# Patient Record
Sex: MALE | Race: BLACK OR AFRICAN AMERICAN | NOT HISPANIC OR LATINO | URBAN - METROPOLITAN AREA
[De-identification: names, ages, dates, MRNs, and addresses within clinical notes are randomized per-mention and may not be internally consistent; named-entity substitution may affect disease eponyms.]

---

## 2017-01-10 ENCOUNTER — HISTORICAL (OUTPATIENT)
Dept: OTOLARYNGOLOGY | Facility: CLINIC | Age: 52
End: 2017-01-10

## 2017-01-12 ENCOUNTER — HISTORICAL (OUTPATIENT)
Dept: OTOLARYNGOLOGY | Facility: CLINIC | Age: 52
End: 2017-01-12

## 2017-03-06 ENCOUNTER — HISTORICAL (OUTPATIENT)
Dept: ADMINISTRATIVE | Facility: HOSPITAL | Age: 52
End: 2017-03-06

## 2017-07-24 ENCOUNTER — HISTORICAL (OUTPATIENT)
Dept: ADMINISTRATIVE | Facility: HOSPITAL | Age: 52
End: 2017-07-24

## 2017-07-24 LAB
ALBUMIN SERPL-MCNC: 3.2 GM/DL (ref 3.4–5)
ALBUMIN/GLOB SERPL: 1 RATIO (ref 1–2)
ALP SERPL-CCNC: 70 UNIT/L (ref 45–117)
ALT SERPL-CCNC: 28 UNIT/L (ref 12–78)
APPEARANCE, UA: CLEAR
AST SERPL-CCNC: 14 UNIT/L (ref 15–37)
BACTERIA #/AREA URNS AUTO: ABNORMAL /[HPF]
BILIRUB SERPL-MCNC: 0.3 MG/DL (ref 0.2–1)
BILIRUB UR QL STRIP: NEGATIVE
BILIRUBIN DIRECT+TOT PNL SERPL-MCNC: 0.1 MG/DL
BILIRUBIN DIRECT+TOT PNL SERPL-MCNC: 0.2 MG/DL
BUN SERPL-MCNC: 14 MG/DL (ref 7–18)
CALCIUM SERPL-MCNC: 9.1 MG/DL (ref 8.5–10.1)
CHLORIDE SERPL-SCNC: 105 MMOL/L (ref 98–107)
CHOLEST SERPL-MCNC: 170 MG/DL
CHOLEST/HDLC SERPL: 5.2 {RATIO} (ref 0–5)
CO2 SERPL-SCNC: 26 MMOL/L (ref 21–32)
COLOR UR: ABNORMAL
CREAT SERPL-MCNC: 0.9 MG/DL (ref 0.6–1.3)
CREAT UR-MCNC: 100 MG/DL
EST. AVERAGE GLUCOSE BLD GHB EST-MCNC: 326 MG/DL
GLOBULIN SER-MCNC: 5.3 GM/ML (ref 2.3–3.5)
GLUCOSE (UA): >1000 MG/DL
GLUCOSE SERPL-MCNC: 250 MG/DL (ref 74–106)
HBA1C MFR BLD: 13 % (ref 4.2–6.3)
HDLC SERPL-MCNC: 33 MG/DL
HGB UR QL STRIP: NEGATIVE
HIV 1+2 AB+HIV1 P24 AG SERPL QL IA: NONREACTIVE
HYALINE CASTS #/AREA URNS LPF: ABNORMAL /[LPF]
KETONES UR QL STRIP: NEGATIVE
LDLC SERPL CALC-MCNC: 106 MG/DL (ref 0–130)
LEUKOCYTE ESTERASE UR QL STRIP: NEGATIVE
MICROALBUMIN UR-MCNC: 111 MG/L (ref 0–19)
MICROALBUMIN/CREAT RATIO PNL UR: 111 MCG/MG CR (ref 0–29)
NITRITE UR QL STRIP: NEGATIVE
PH UR STRIP: 5.5 [PH] (ref 4.5–8)
POTASSIUM SERPL-SCNC: 4.1 MMOL/L (ref 3.5–5.1)
PROT SERPL-MCNC: 8.5 GM/DL (ref 6.4–8.2)
PROT UR QL STRIP: 20 MG/DL
RBC #/AREA URNS AUTO: ABNORMAL /[HPF]
SODIUM SERPL-SCNC: 138 MMOL/L (ref 136–145)
SP GR UR STRIP: 1.03 (ref 1–1.03)
SQUAMOUS #/AREA URNS LPF: ABNORMAL /[LPF]
TRIGL SERPL-MCNC: 156 MG/DL
UROBILINOGEN UR STRIP-ACNC: NORMAL
VLDLC SERPL CALC-MCNC: 31 MG/DL
WBC #/AREA URNS AUTO: ABNORMAL /HPF

## 2018-05-21 ENCOUNTER — HISTORICAL (OUTPATIENT)
Dept: INTERNAL MEDICINE | Facility: CLINIC | Age: 53
End: 2018-05-21

## 2018-05-21 LAB
ABS NEUT (OLG): 2.73 X10(3)/MCL (ref 2.1–9.2)
ALBUMIN SERPL-MCNC: 3.2 GM/DL (ref 3.4–5)
ALBUMIN/GLOB SERPL: 1 RATIO (ref 1–2)
ALP SERPL-CCNC: 70 UNIT/L (ref 45–117)
ALT SERPL-CCNC: 34 UNIT/L (ref 12–78)
AST SERPL-CCNC: 15 UNIT/L (ref 15–37)
BASOPHILS # BLD AUTO: 0.03 X10(3)/MCL
BASOPHILS NFR BLD AUTO: 0 %
BILIRUB SERPL-MCNC: 0.4 MG/DL (ref 0.2–1)
BILIRUBIN DIRECT+TOT PNL SERPL-MCNC: 0.1 MG/DL
BILIRUBIN DIRECT+TOT PNL SERPL-MCNC: 0.3 MG/DL
BUN SERPL-MCNC: 16 MG/DL (ref 7–18)
CALCIUM SERPL-MCNC: 8.9 MG/DL (ref 8.5–10.1)
CHLORIDE SERPL-SCNC: 108 MMOL/L (ref 98–107)
CHOLEST SERPL-MCNC: 170 MG/DL
CHOLEST/HDLC SERPL: 5 {RATIO} (ref 0–5)
CO2 SERPL-SCNC: 27 MMOL/L (ref 21–32)
CREAT SERPL-MCNC: 0.9 MG/DL (ref 0.6–1.3)
ERYTHROCYTE [DISTWIDTH] IN BLOOD BY AUTOMATED COUNT: 13.2 % (ref 11.5–14.5)
EST. AVERAGE GLUCOSE BLD GHB EST-MCNC: 298 MG/DL
GLOBULIN SER-MCNC: 5.8 GM/ML (ref 2.3–3.5)
GLUCOSE SERPL-MCNC: 137 MG/DL (ref 74–106)
HBA1C MFR BLD: 12 % (ref 4.2–6.3)
HCT VFR BLD AUTO: 40.7 % (ref 40–51)
HDLC SERPL-MCNC: 34 MG/DL
HGB BLD-MCNC: 12.9 GM/DL (ref 13.5–17.5)
IMM GRANULOCYTES # BLD AUTO: 0.01 10*3/UL
IMM GRANULOCYTES NFR BLD AUTO: 0 %
LDLC SERPL CALC-MCNC: 110 MG/DL (ref 0–130)
LYMPHOCYTES # BLD AUTO: 2.65 X10(3)/MCL
LYMPHOCYTES NFR BLD AUTO: 45 % (ref 13–40)
MCH RBC QN AUTO: 26.8 PG (ref 26–34)
MCHC RBC AUTO-ENTMCNC: 31.7 GM/DL (ref 31–37)
MCV RBC AUTO: 84.6 FL (ref 80–100)
MONOCYTES # BLD AUTO: 0.46 X10(3)/MCL
MONOCYTES NFR BLD AUTO: 8 % (ref 4–12)
NEUTROPHILS # BLD AUTO: 2.73 X10(3)/MCL
NEUTROPHILS NFR BLD AUTO: 46 X10(3)/MCL
PLATELET # BLD AUTO: 277 X10(3)/MCL (ref 130–400)
PMV BLD AUTO: 10.5 FL (ref 7.4–10.4)
POTASSIUM SERPL-SCNC: 4 MMOL/L (ref 3.5–5.1)
PROT SERPL-MCNC: 9 GM/DL (ref 6.4–8.2)
RBC # BLD AUTO: 4.81 X10(6)/MCL (ref 4.5–5.9)
SODIUM SERPL-SCNC: 138 MMOL/L (ref 136–145)
TRIGL SERPL-MCNC: 128 MG/DL
VLDLC SERPL CALC-MCNC: 26 MG/DL
WBC # SPEC AUTO: 5.9 X10(3)/MCL (ref 4.5–11)

## 2019-06-24 ENCOUNTER — HISTORICAL (OUTPATIENT)
Dept: ADMINISTRATIVE | Facility: HOSPITAL | Age: 54
End: 2019-06-24

## 2019-06-24 LAB
APPEARANCE, UA: CLEAR
BACTERIA #/AREA URNS AUTO: ABNORMAL /[HPF]
BILIRUB UR QL STRIP: NEGATIVE
BUN SERPL-MCNC: 23 MG/DL (ref 7–18)
CALCIUM SERPL-MCNC: 9.3 MG/DL (ref 8.5–10.1)
CHLORIDE SERPL-SCNC: 109 MMOL/L (ref 98–107)
CHOLEST SERPL-MCNC: 186 MG/DL
CHOLEST/HDLC SERPL: 3.9 {RATIO} (ref 0–5)
CO2 SERPL-SCNC: 28 MMOL/L (ref 21–32)
COLOR UR: ABNORMAL
CREAT SERPL-MCNC: 1 MG/DL (ref 0.6–1.3)
CREAT UR-MCNC: 64 MG/DL
CREAT/UREA NIT SERPL: 23
EST. AVERAGE GLUCOSE BLD GHB EST-MCNC: 151 MG/DL
GLUCOSE (UA): NORMAL
GLUCOSE SERPL-MCNC: 124 MG/DL (ref 74–106)
HBA1C MFR BLD: 6.9 % (ref 4.2–6.3)
HDLC SERPL-MCNC: 48 MG/DL
HGB UR QL STRIP: 0.03 MG/DL
HYALINE CASTS #/AREA URNS LPF: ABNORMAL /[LPF]
KETONES UR QL STRIP: NEGATIVE
LDLC SERPL CALC-MCNC: 124 MG/DL (ref 0–130)
LEUKOCYTE ESTERASE UR QL STRIP: NEGATIVE
MICROALBUMIN UR-MCNC: 1070 MG/L (ref 0–19)
MICROALBUMIN/CREAT RATIO PNL UR: 1671.9 MCG/MG CR (ref 0–29)
NITRITE UR QL STRIP: NEGATIVE
PH UR STRIP: 5.5 [PH] (ref 4.5–8)
POTASSIUM SERPL-SCNC: 4.3 MMOL/L (ref 3.5–5.1)
PROT UR QL STRIP: 200 MG/DL
RBC #/AREA URNS AUTO: ABNORMAL /[HPF]
SODIUM SERPL-SCNC: 139 MMOL/L (ref 136–145)
SP GR UR STRIP: 1.01 (ref 1–1.03)
SQUAMOUS #/AREA URNS LPF: ABNORMAL /[LPF]
TRIGL SERPL-MCNC: 72 MG/DL
UROBILINOGEN UR STRIP-ACNC: NORMAL
VLDLC SERPL CALC-MCNC: 14 MG/DL
WBC #/AREA URNS AUTO: ABNORMAL /HPF

## 2019-07-11 ENCOUNTER — HISTORICAL (OUTPATIENT)
Dept: INTERNAL MEDICINE | Facility: CLINIC | Age: 54
End: 2019-07-11

## 2019-07-22 ENCOUNTER — HISTORICAL (OUTPATIENT)
Dept: INTERNAL MEDICINE | Facility: CLINIC | Age: 54
End: 2019-07-22

## 2019-07-22 LAB
ABS NEUT (OLG): 3.05 X10(3)/MCL (ref 2.1–9.2)
ALBUMIN SERPL-MCNC: 3.2 GM/DL (ref 3.4–5)
ALBUMIN/GLOB SERPL: 0.6 RATIO (ref 1.1–2)
ALP SERPL-CCNC: 76 UNIT/L (ref 45–117)
ALT SERPL-CCNC: 35 UNIT/L (ref 12–78)
AST SERPL-CCNC: 20 UNIT/L (ref 15–37)
BASOPHILS # BLD AUTO: 0.03 X10(3)/MCL
BASOPHILS NFR BLD AUTO: 0 %
BILIRUB SERPL-MCNC: 0.2 MG/DL (ref 0.2–1)
BILIRUBIN DIRECT+TOT PNL SERPL-MCNC: <0.1 MG/DL
BILIRUBIN DIRECT+TOT PNL SERPL-MCNC: >0.1 MG/DL
BUN SERPL-MCNC: 24 MG/DL (ref 7–18)
CALCIUM SERPL-MCNC: 9.4 MG/DL (ref 8.5–10.1)
CHLORIDE SERPL-SCNC: 109 MMOL/L (ref 98–107)
CO2 SERPL-SCNC: 29 MMOL/L (ref 21–32)
CREAT SERPL-MCNC: 1.1 MG/DL (ref 0.6–1.3)
EOSINOPHIL # BLD AUTO: 0.01 10*3/UL
EOSINOPHIL NFR BLD AUTO: 0 %
ERYTHROCYTE [DISTWIDTH] IN BLOOD BY AUTOMATED COUNT: 15.3 % (ref 11.5–14.5)
GLOBULIN SER-MCNC: 5.8 GM/ML (ref 2.3–3.5)
GLUCOSE SERPL-MCNC: 148 MG/DL (ref 74–106)
HCT VFR BLD AUTO: 41.4 % (ref 40–51)
HGB BLD-MCNC: 12.6 GM/DL (ref 13.5–17.5)
IMM GRANULOCYTES # BLD AUTO: 0.01 10*3/UL
IMM GRANULOCYTES NFR BLD AUTO: 0 %
LYMPHOCYTES # BLD AUTO: 2.89 X10(3)/MCL
LYMPHOCYTES NFR BLD AUTO: 44 % (ref 13–40)
MCH RBC QN AUTO: 25.5 PG (ref 26–34)
MCHC RBC AUTO-ENTMCNC: 30.4 GM/DL (ref 31–37)
MCV RBC AUTO: 83.6 FL (ref 80–100)
MONOCYTES # BLD AUTO: 0.51 X10(3)/MCL
MONOCYTES NFR BLD AUTO: 8 % (ref 0–24)
NEUTROPHILS # BLD AUTO: 3.05 X10(3)/MCL
NEUTROPHILS NFR BLD AUTO: 47 X10(3)/MCL
PLATELET # BLD AUTO: 287 X10(3)/MCL (ref 130–400)
PMV BLD AUTO: 10.9 FL (ref 7.4–10.4)
POTASSIUM SERPL-SCNC: 4.2 MMOL/L (ref 3.5–5.1)
PROT SERPL-MCNC: 8.4 GM/DL
PROT SERPL-MCNC: 9 GM/DL (ref 6.4–8.2)
RBC # BLD AUTO: 4.95 X10(6)/MCL (ref 4.5–5.9)
SODIUM SERPL-SCNC: 141 MMOL/L (ref 136–145)
WBC # SPEC AUTO: 6.5 X10(3)/MCL (ref 4.5–11)

## 2019-07-24 ENCOUNTER — HISTORICAL (OUTPATIENT)
Dept: INTERNAL MEDICINE | Facility: CLINIC | Age: 54
End: 2019-07-24

## 2020-03-11 ENCOUNTER — HISTORICAL (OUTPATIENT)
Dept: INTERNAL MEDICINE | Facility: CLINIC | Age: 55
End: 2020-03-11

## 2020-03-11 LAB
ABS NEUT (OLG): 2.81 X10(3)/MCL (ref 2.1–9.2)
BASOPHILS # BLD AUTO: 0 X10(3)/MCL (ref 0–0.2)
BASOPHILS NFR BLD AUTO: 0 %
CHOLEST SERPL-MCNC: 197 MG/DL
CHOLEST/HDLC SERPL: 4.9 {RATIO} (ref 0–5)
CREAT UR-MCNC: 131 MG/DL
CREAT UR-MCNC: 131 MG/DL
DEPRECATED CALCIDIOL+CALCIFEROL SERPL-MC: 4.7 NG/ML (ref 30–80)
EOSINOPHIL # BLD AUTO: 0.1 X10(3)/MCL (ref 0–0.9)
EOSINOPHIL NFR BLD AUTO: 2 %
ERYTHROCYTE [DISTWIDTH] IN BLOOD BY AUTOMATED COUNT: 13.6 % (ref 11.5–14.5)
EST. AVERAGE GLUCOSE BLD GHB EST-MCNC: 235 MG/DL
HBA1C MFR BLD: 9.8 % (ref 4.2–6.3)
HCT VFR BLD AUTO: 41 % (ref 40–51)
HDLC SERPL-MCNC: 40 MG/DL (ref 40–59)
HGB BLD-MCNC: 12.8 GM/DL (ref 13.5–17.5)
IMM GRANULOCYTES # BLD AUTO: 0.01 10*3/UL
IMM GRANULOCYTES NFR BLD AUTO: 0 %
LDLC SERPL CALC-MCNC: 132 MG/DL
LYMPHOCYTES # BLD AUTO: 2 X10(3)/MCL (ref 0.6–4.6)
LYMPHOCYTES NFR BLD AUTO: 37 %
MCH RBC QN AUTO: 26.7 PG (ref 26–34)
MCHC RBC AUTO-ENTMCNC: 31.2 GM/DL (ref 31–37)
MCV RBC AUTO: 85.4 FL (ref 80–100)
MICROALBUMIN UR-MCNC: 3500 MG/L (ref 0–19)
MICROALBUMIN/CREAT RATIO PNL UR: 2671.8 MCG/MG CR (ref 0–29)
MONOCYTES # BLD AUTO: 0.4 X10(3)/MCL (ref 0.1–1.3)
MONOCYTES NFR BLD AUTO: 7 %
NEUTROPHILS # BLD AUTO: 2.81 X10(3)/MCL (ref 2.1–9.2)
NEUTROPHILS NFR BLD AUTO: 53 %
PLATELET # BLD AUTO: 235 X10(3)/MCL (ref 130–400)
PMV BLD AUTO: 10.9 FL (ref 7.4–10.4)
PROT UR STRIP-MCNC: 434.8 MG/DL
PROT/CREAT UR-RTO: 3319.1 MG/GM
RBC # BLD AUTO: 4.8 X10(6)/MCL (ref 4.5–5.9)
TRIGL SERPL-MCNC: 123 MG/DL
VLDLC SERPL CALC-MCNC: 25 MG/DL
WBC # SPEC AUTO: 5.3 X10(3)/MCL (ref 4.5–11)

## 2021-04-07 ENCOUNTER — HISTORICAL (OUTPATIENT)
Dept: ADMINISTRATIVE | Facility: HOSPITAL | Age: 56
End: 2021-04-07

## 2021-04-07 LAB
ABS NEUT (OLG): 3.83 X10(3)/MCL (ref 2.1–9.2)
ALBUMIN SERPL-MCNC: 2.7 GM/DL (ref 3.5–5)
ALBUMIN/GLOB SERPL: 0.5 RATIO (ref 1.1–2)
ALP SERPL-CCNC: 71 UNIT/L (ref 40–150)
ALT SERPL-CCNC: 11 UNIT/L (ref 0–55)
AST SERPL-CCNC: 19 UNIT/L (ref 5–34)
BASOPHILS # BLD AUTO: 0 X10(3)/MCL (ref 0–0.2)
BASOPHILS NFR BLD AUTO: 0 %
BILIRUB SERPL-MCNC: 0.4 MG/DL
BILIRUBIN DIRECT+TOT PNL SERPL-MCNC: 0.1 MG/DL (ref 0–0.5)
BILIRUBIN DIRECT+TOT PNL SERPL-MCNC: 0.3 MG/DL (ref 0–0.8)
BUN SERPL-MCNC: 30.6 MG/DL (ref 8.4–25.7)
CALCIUM SERPL-MCNC: 8.8 MG/DL (ref 8.4–10.2)
CHLORIDE SERPL-SCNC: 109 MMOL/L (ref 98–107)
CHOLEST SERPL-MCNC: 222 MG/DL
CHOLEST/HDLC SERPL: 7 {RATIO} (ref 0–5)
CO2 SERPL-SCNC: 23 MMOL/L (ref 22–29)
CREAT SERPL-MCNC: 1.78 MG/DL (ref 0.73–1.18)
DEPRECATED CALCIDIOL+CALCIFEROL SERPL-MC: 6.2 NG/ML (ref 30–80)
EOSINOPHIL # BLD AUTO: 0.2 X10(3)/MCL (ref 0–0.9)
EOSINOPHIL NFR BLD AUTO: 3 %
ERYTHROCYTE [DISTWIDTH] IN BLOOD BY AUTOMATED COUNT: 14.9 % (ref 11.5–14.5)
EST. AVERAGE GLUCOSE BLD GHB EST-MCNC: 208.7 MG/DL
GLOBULIN SER-MCNC: 5.4 GM/DL (ref 2.4–3.5)
GLUCOSE SERPL-MCNC: 80 MG/DL (ref 74–100)
HBA1C MFR BLD: 8.9 %
HCT VFR BLD AUTO: 37 % (ref 40–51)
HDLC SERPL-MCNC: 33 MG/DL (ref 35–60)
HGB BLD-MCNC: 11.4 GM/DL (ref 13.5–17.5)
IMM GRANULOCYTES # BLD AUTO: 0.02 10*3/UL
IMM GRANULOCYTES NFR BLD AUTO: 0 %
LDLC SERPL CALC-MCNC: 165 MG/DL (ref 50–140)
LYMPHOCYTES # BLD AUTO: 2.7 X10(3)/MCL (ref 0.6–4.6)
LYMPHOCYTES NFR BLD AUTO: 37 %
MCH RBC QN AUTO: 26 PG (ref 26–34)
MCHC RBC AUTO-ENTMCNC: 30.8 GM/DL (ref 31–37)
MCV RBC AUTO: 84.5 FL (ref 80–100)
MONOCYTES # BLD AUTO: 0.6 X10(3)/MCL (ref 0.1–1.3)
MONOCYTES NFR BLD AUTO: 8 %
NEUTROPHILS # BLD AUTO: 3.83 X10(3)/MCL (ref 2.1–9.2)
NEUTROPHILS NFR BLD AUTO: 52 %
PLATELET # BLD AUTO: 291 X10(3)/MCL (ref 130–400)
PMV BLD AUTO: 10.5 FL (ref 7.4–10.4)
POTASSIUM SERPL-SCNC: 4.1 MMOL/L (ref 3.5–5.1)
PROT SERPL-MCNC: 8.1 GM/DL (ref 6.4–8.3)
RBC # BLD AUTO: 4.38 X10(6)/MCL (ref 4.5–5.9)
SODIUM SERPL-SCNC: 137 MMOL/L (ref 136–145)
T4 FREE SERPL-MCNC: 0.9 NG/DL (ref 0.7–1.48)
TRIGL SERPL-MCNC: 122 MG/DL (ref 34–140)
TSH SERPL-ACNC: 2.08 UIU/ML (ref 0.35–4.94)
VLDLC SERPL CALC-MCNC: 24 MG/DL
WBC # SPEC AUTO: 7.3 X10(3)/MCL (ref 4.5–11)

## 2022-04-10 ENCOUNTER — HISTORICAL (OUTPATIENT)
Dept: ADMINISTRATIVE | Facility: HOSPITAL | Age: 57
End: 2022-04-10

## 2022-04-26 VITALS
DIASTOLIC BLOOD PRESSURE: 90 MMHG | WEIGHT: 234.56 LBS | OXYGEN SATURATION: 100 % | BODY MASS INDEX: 35.55 KG/M2 | SYSTOLIC BLOOD PRESSURE: 172 MMHG | HEIGHT: 68 IN

## 2022-05-02 NOTE — HISTORICAL [facility]
This is a historical note converted from Helen. Formatting and pictures may have been removed.  Please reference Helen for original formatting and attached multimedia. Chief Complaint  medication refill/diabetic supplies  History of Present Illness  54-year-old -American male presents to clinic for follow-up appointment.?Past medical history includes poorly controlled diabetes, poorly controlled hypertension, dyslipidemia, and chronic back pain.?Patient recently had a right BKA?4/29/19?due to?a wound that continued to spread and became gangrenous.??Since surgery, patient is doing well.??Denies depression,?anxiety, or falls.??Wound has healed nicely.??Using a walker for ambulation?and will be obtaining a prosthetic soon.??Is going see a podiatrist on July 17 for his left?foot for nail care.? Denies any shortness of breath, chest pain,?left leg swelling.? Has chronic?back pain.  Review of Systems  Remaining of the 14 point review of systems has been reviewed and is negative except as documented above.  Physical Exam  Vitals & Measurements  T:?36.6? ?C (Oral)? HR:?84(Peripheral)? RR:?20? BP:?146/78?  HT:?175?cm? WT:?92?kg? BMI:?30.04?  General: Alert and oriented, No acute distress.  HEENT: NCAT, EOMI, Normal hearing  Respiratory: CTAB, No c/r/w, Respirations non-labored, Breath sounds equal.  Cardiovascular: Normal rate, Regular rhythm, No m/r/g, No c/c/e  Gastrointestinal: Soft, NT, ND, +BS  Genitourinary: No costovertebral angle or suprapubic tenderness.  Musculoskeletal: Right BKA. Right compression sleeve on BKA stump. Incision well healed. ?Normal range of motion, Normal strength, No swelling.  Integumentary: Warm, Dry, Intact.  Neurologic: Alert, Oriented, No focal deficits, CN II-XII grossly intact  Psychiatric: Cooperative, Appropriate mood & affect, Normal judgment.  Assessment/Plan  1.?Poorly controlled diabetes mellitus?E11.65  ?Diabetic fundus exam referral ?today. ?Foot exam today. FLP  today.  Ordered:  Basic Metabolic Panel, Routine collect, *Est. 06/24/19 3:00:00 CDT, Blood, Order for future visit, *Est. Stop date 06/24/19 3:00:00 CDT, Lab Collect, Poorly controlled diabetes mellitus, 06/25/19 5:00:00 CDT  Clinic Follow up, *Est. 08/24/19 3:00:00 CDT, Order for future visit, Poorly controlled diabetes mellitus, [facility]  Hemoglobin A1C [facility], Routine collect, *Est. 06/24/19 3:00:00 CDT, Blood, Order for future visit, *Est. Stop date 06/24/19 3:00:00 CDT, Lab Collect, Poorly controlled diabetes mellitus, 06/24/19 9:28:00 CDT  Internal Referral to Ophthalmology Fundus Clinic, dm fundus exam, *Est. 06/24/19 3:00:00 CDT, Future Visit?, Poorly controlled diabetes mellitus  Lipid Panel, Routine collect, *Est. 06/24/19 3:00:00 CDT, Blood, Order for future visit, *Est. Stop date 06/24/19 3:00:00 CDT, Lab Collect, Poorly controlled diabetes mellitus, 06/24/19 9:28:00 CDT  Microalbum/Creatinine Ratio Urine (Microalb/Creat), Routine collect, Urine, Order for future visit, *Est. 06/24/19 3:00:00 CDT, *Est. Stop date 06/24/19 3:00:00 CDT, Nurse collect, Poorly controlled diabetes mellitus  Urinalysis with Microscopic if Indicated, Routine collect, Urine, Order for future visit, *Est. 06/24/19 3:00:00 CDT, *Est. Stop date 06/24/19 3:00:00 CDT, Nurse collect, Poorly controlled diabetes mellitus  ?  2.?Poorly-controlled hypertension?I10  ?Patient states home blood pressures are?usually?normal?when home health comes. ?Does not have these records. Repeat BP better but still elevated. ?Will double Coreg to 6.25 mg twice a day.? Rate is 80s and 90s.  ?  4.?Hx of right BKA?Z89.511  ?Well healed. ?Will be getting a prosthetic.  ?  5.?Colon cancer screening?Z12.11  ?FIT ordered  Ordered:  Occult Blood Fecal Immunoassay, Routine collect, Stool, Order for future visit, *Est. 07/01/19 3:00:00 CDT, *Est. Stop date 07/01/19 3:00:00 CDT, Nurse collect, Colon cancer screening  ?  6.?Need for Tdap  vaccination?Z23  ?refused.  ?  RTC 2 months. Get information from Home Health. Labs today: BMP, hemoglobin A1c, lipid panel, UA, microalbumin creatinine ratio.  Referrals  Internal Referral to Ophthal[facility]us Clinic, dm fundus exam, *Est. 06/24/19 3:00:00 CDT, Future Visit?, Poorly controlled diabetes mellitus  Clinic Follow up, *Est. 08/24/19 3:00:00 CDT, Order for future visit, Poorly controlled diabetes mellitus, [facility]   Problem List/Past Medical History  Ongoing  Chronic back pain  Diabetes mellitus  DM - Diabetes mellitus  Dyslipidemia  HTN - Hypertension  HTN - Hypertension  Hypercholesteremia  Nasal cavity mass  Neuropathy  Status post below knee amputation of right lower extremity  Historical  No qualifying data  Procedure/Surgical History  Amputation Below Knee (Right) (05/01/2019)  Detachment at Right Lower Leg, Mid, Open Approach (05/01/2019)  Amputation Below Knee (Right) (04/29/2019)  Detachment at Right Lower Leg, Low, Open Approach (04/29/2019)  Incision & Drainage Major (07/27/2015)  Other fasciectomy (07/27/2015)   Medications  aspirin 81 mg oral tablet, 81 mg= 1 tab(s), Oral, Daily, 2 refills  atorvastatin 40 mg oral tablet, 40 mg= 1 tab(s), Oral, Daily, 2 refills  cetirizine 10 mg oral tablet, 10 mg= 1 tab(s), Oral, Daily, PRN, 2 refills  Coreg 6.25 mg oral tablet, 6.25 mg= 1 tab(s), Oral, BID, 2 refills  docusate sodium 50 mg oral capsule, 50 mg= 1 cap(s), Oral, BID, PRN, 3 refills  FLEX PEN NEEDLES, See Instructions, 1 refills  FLEXPEN NEEDLES, See Instructions  Glucometer, See Instructions, 11 refills  Glucometer Kit, See Instructions, 2 refills  Glucose test strips, See Instructions, 6 refills  HYDROCODONE/[id number] TB, 1 tab(s), Oral, TID  Levemir 100 units/mL subcutaneous solution, 50 units, Subcutaneous, Once a day (at bedtime), 4 refills  lisinopril 40 mg oral tablet, 40 mg= 1 tab(s), Oral, Daily, 2 refills  metFORMIN 1000 mg oral tablet, 1000 mg= 1 tab(s), Oral,  BID, 2 refills  Norvasc 10 mg oral tablet, 10 mg= 1 tab(s), Oral, Daily, 2 refills  oxyCODONE 10 mg oral tablet, 10 mg= 1 tab(s), Oral, q6hr  Prostetic, See Instructions  Prosthetic, See Instructions  Rolling Walker, See Instructions  Allergies  No Known Allergies  Social History  Abuse/Neglect  No, 06/24/2019  Alcohol - Denies Alcohol Use, 07/29/2014  Employment/School  disabled, 03/15/2016  Exercise  Exercise duration: 30. Exercise frequency: Daily. Self assessment: Good condition. Exercise type: Walking., 09/25/2017  Home/Environment  Lives with Spouse. Living situation: Home/Independent. Home equipment: Glucose monitoring, Walker/Cane., 09/26/2016  Lives with Significant other. Living situation: Home/Independent. Home equipment: Walker/Cane., 03/15/2016  Lives with Significant other. Living situation: Home/Independent., 01/12/2016  Nutrition/Health  Type of diet: no starches. Regular, Wants to lose weight: Yes. Sleeping concerns: No. Feels highly stressed: No., 05/10/2018  Substance Use - Denies Substance Abuse, 07/29/2014  Never, 03/15/2016  Tobacco - Denies Tobacco Use, 07/29/2014  Former smoker, quit more than 30 days ago, N/A, 06/24/2019  Family History  Diabetes mellitus type 1.: Mother and Sister.  Health Maintenance  Health Maintenance  ???Pending?(in the next year)  ??? ??OverDue  ??? ? ? ?Hypertension Management-Education due??06/05/18??and every 1??year(s)  ??? ? ? ?Diabetes Maintenance-Microalbumin due??07/24/18??and every 1??year(s)  ??? ? ? ?Diabetes Maintenance-Urine Dipstick due??07/24/18??and every 1??year(s)  ??? ? ? ?Alcohol Misuse Screening due??01/01/19??and every 1??year(s)  ??? ? ? ?Smoking Cessation due??01/01/19??and every 1??year(s)  ??? ? ? ?Diabetes Maintenance-Fasting Lipid Profile due??05/21/19??and every 1??year(s)  ??? ??Due?  ??? ? ? ?Colorectal Screening due??05/20/19??and every 1??year(s)  ??? ? ? ?Diabetes Maintenance-Eye Exam due??06/24/19??and every?  ??? ? ? ?Lung Cancer  Screening due??06/24/19??and every 1??year(s)  ??? ??Refused?  ??? ? ? ?Tetanus Vaccine due??06/24/19??and every 10??year(s)  ??? ??Due In Future?  ??? ? ? ?Obesity Screening not due until??01/01/20??and every 1??year(s)  ??? ? ? ?Diabetes Maintenance-HgbA1c not due until??04/28/20??and every 1??year(s)  ??? ? ? ?Hypertension Management-BMP not due until??05/01/20??and every 1??year(s)  ??? ? ? ?Diabetes Maintenance-Serum Creatinine not due until??05/02/20??and every 1??year(s)  ??? ? ? ?Blood Pressure Screening not due until??06/23/20??and every 1??year(s)  ??? ? ? ?Body Mass Index Check not due until??06/23/20??and every 1??year(s)  ??? ? ? ?Depression Screening not due until??06/23/20??and every 1??year(s)  ??? ? ? ?Diabetes Maintenance-Foot Exam not due until??06/23/20??and every 1??year(s)  ??? ? ? ?Hypertension Management-Blood Pressure not due until??06/23/20??and every 1??year(s)  ???Satisfied?(in the past 1 year)  ??? ??Satisfied?  ??? ? ? ?ADL Screening on??06/24/19.??Satisfied by Irene Del Valle RN  ??? ? ? ?Aspirin Therapy for CVD Prevention on??06/24/19.??Satisfied by Estrellita Hood DO  ??? ? ? ?Blood Pressure Screening on??06/24/19.??Satisfied by Irene Del Valle RN  ??? ? ? ?Body Mass Index Check on??06/24/19.??Satisfied by Irene Del Valle RN  ??? ? ? ?Depression Screening on??06/24/19.??Satisfied by Estrellita Hood DO  ??? ? ? ?Diabetes Maintenance-Foot Exam on??06/24/19.??Satisfied by Estrellita Hood DO  ??? ? ? ?Diabetes Maintenance-Serum Creatinine on??05/02/19.??Satisfied by Sheela Trammell  ??? ? ? ?Diabetes Maintenance-HgbA1c on??04/29/19.??Satisfied by Sharri Mir  ??? ? ? ?Diabetes Screening on??05/02/19.??Satisfied by Sheela Trammell  ??? ? ? ?Hypertension Management-Blood Pressure on??06/24/19.??Satisfied by Eligio GLORIA, Irene  ??? ? ? ?Hypertension Management-BMP on??05/02/19.??Satisfied by Sheela Trammell  ??? ? ? ?Obesity Screening on??06/24/19.??Satisfied by  Eligio GLORIA, Irene  ??? ? ? ?Smoking Cessation on??09/03/18.??Satisfied by Danielle Ulrich RN  ??? ? ? ?Smoking Cessation (Coronary Artery Disease) on??09/03/18.??Satisfied by Danielle Ulrich RN  ??? ? ? ?Smoking Cessation (Diabetes) on??09/03/18.??Satisfied by Danielle Ulrich RN  ?      Patient seen and examined,?reviewed with the resident,?agree with?assessment?and?plan.   Patient recent BKA has limited his ability to function. He has the desire, will, and ability?to function with a prosthesis. He is level K3. He has comorbidities to include the above listed in my note.   Patient seen and examined,?reviewed with the resident,?agree with?assessment?and?plan.

## 2023-12-13 DIAGNOSIS — Z01.818 OTHER SPECIFIED PRE-OPERATIVE EXAMINATION: ICD-10-CM

## 2023-12-13 DIAGNOSIS — N18.6 END STAGE RENAL DISEASE: Primary | ICD-10-CM

## 2023-12-27 RX ORDER — CLONIDINE HYDROCHLORIDE 0.1 MG/1
0.1 TABLET ORAL 3 TIMES DAILY
COMMUNITY
Start: 2023-11-28

## 2023-12-27 RX ORDER — GABAPENTIN 300 MG/1
300 CAPSULE ORAL 3 TIMES DAILY
COMMUNITY
Start: 2023-10-30

## 2023-12-27 RX ORDER — CLINDAMYCIN HYDROCHLORIDE 300 MG/1
300 CAPSULE ORAL 3 TIMES DAILY
COMMUNITY
Start: 2023-08-01

## 2023-12-27 RX ORDER — SODIUM CITRATE AND CITRIC ACID MONOHYDRATE 334; 500 MG/5ML; MG/5ML
SOLUTION ORAL
COMMUNITY
Start: 2023-12-02

## 2023-12-27 RX ORDER — ERGOCALCIFEROL 1.25 MG/1
50000 CAPSULE ORAL
COMMUNITY
Start: 2023-11-28

## 2023-12-27 RX ORDER — AMLODIPINE BESYLATE 10 MG/1
10 TABLET ORAL NIGHTLY
COMMUNITY
Start: 2023-09-19

## 2023-12-27 RX ORDER — HYDROCODONE BITARTRATE AND ACETAMINOPHEN 7.5; 325 MG/1; MG/1
1 TABLET ORAL 2 TIMES DAILY
COMMUNITY
Start: 2023-11-29 | End: 2024-01-04

## 2023-12-27 RX ORDER — TAMSULOSIN HYDROCHLORIDE 0.4 MG/1
1 CAPSULE ORAL NIGHTLY
COMMUNITY
Start: 2023-10-19

## 2023-12-27 RX ORDER — PATIROMER 8.4 G/1
POWDER, FOR SUSPENSION ORAL
COMMUNITY
Start: 2023-12-02

## 2023-12-27 RX ORDER — INSULIN DETEMIR 100 [IU]/ML
52 INJECTION, SOLUTION SUBCUTANEOUS
COMMUNITY
Start: 2021-06-18

## 2023-12-27 RX ORDER — HYDRALAZINE HYDROCHLORIDE 25 MG/1
25 TABLET, FILM COATED ORAL 3 TIMES DAILY
COMMUNITY
Start: 2023-10-20

## 2023-12-27 RX ORDER — ISOSORBIDE MONONITRATE 30 MG/1
90 TABLET, EXTENDED RELEASE ORAL EVERY MORNING
COMMUNITY
Start: 2023-10-24

## 2023-12-27 RX ORDER — CYCLOBENZAPRINE HCL 10 MG
10 TABLET ORAL 2 TIMES DAILY
COMMUNITY
Start: 2023-10-30

## 2024-01-04 ENCOUNTER — OFFICE VISIT (OUTPATIENT)
Dept: VASCULAR SURGERY | Facility: CLINIC | Age: 59
End: 2024-01-04
Attending: STUDENT IN AN ORGANIZED HEALTH CARE EDUCATION/TRAINING PROGRAM
Payer: MEDICARE

## 2024-01-04 VITALS
HEIGHT: 67 IN | WEIGHT: 209 LBS | HEART RATE: 80 BPM | BODY MASS INDEX: 32.8 KG/M2 | DIASTOLIC BLOOD PRESSURE: 96 MMHG | SYSTOLIC BLOOD PRESSURE: 199 MMHG

## 2024-01-04 DIAGNOSIS — N18.5 CKD (CHRONIC KIDNEY DISEASE), STAGE V: Primary | ICD-10-CM

## 2024-01-04 PROCEDURE — 99203 OFFICE O/P NEW LOW 30 MIN: CPT | Mod: ,,, | Performed by: SPECIALIST

## 2024-01-04 RX ORDER — TORSEMIDE 20 MG/1
40 TABLET ORAL EVERY MORNING
COMMUNITY

## 2024-01-04 RX ORDER — GUAIFENESIN 100 MG/5ML
81 LIQUID (ML) ORAL DAILY
COMMUNITY

## 2025-04-25 DIAGNOSIS — N18.6 ENCOUNTER REGARDING VASCULAR ACCESS FOR DIALYSIS FOR END-STAGE RENAL DISEASE: Primary | ICD-10-CM

## 2025-04-25 DIAGNOSIS — Z99.2 ENCOUNTER REGARDING VASCULAR ACCESS FOR DIALYSIS FOR END-STAGE RENAL DISEASE: Primary | ICD-10-CM

## 2025-04-25 DIAGNOSIS — N18.6 ESRD (END STAGE RENAL DISEASE): ICD-10-CM

## 2025-04-25 DIAGNOSIS — Z99.2 HEMODIALYSIS PATIENT: ICD-10-CM

## 2025-04-25 DIAGNOSIS — N18.6 ENCOUNTER REGARDING VASCULAR ACCESS FOR DIALYSIS FOR ESRD: ICD-10-CM

## 2025-04-25 DIAGNOSIS — M79.644 FINGER PAIN, RIGHT: Primary | ICD-10-CM

## 2025-04-25 DIAGNOSIS — Z99.2 ENCOUNTER REGARDING VASCULAR ACCESS FOR DIALYSIS FOR ESRD: ICD-10-CM

## 2025-06-17 ENCOUNTER — OFFICE VISIT (OUTPATIENT)
Dept: VASCULAR SURGERY | Facility: CLINIC | Age: 60
End: 2025-06-17
Payer: MEDICARE

## 2025-06-17 VITALS
WEIGHT: 210 LBS | HEIGHT: 67 IN | OXYGEN SATURATION: 95 % | TEMPERATURE: 98 F | HEART RATE: 76 BPM | DIASTOLIC BLOOD PRESSURE: 49 MMHG | BODY MASS INDEX: 32.96 KG/M2 | RESPIRATION RATE: 20 BRPM | SYSTOLIC BLOOD PRESSURE: 127 MMHG

## 2025-06-17 DIAGNOSIS — N18.6 ESRD (END STAGE RENAL DISEASE): ICD-10-CM

## 2025-06-17 DIAGNOSIS — Z99.2 ENCOUNTER REGARDING VASCULAR ACCESS FOR DIALYSIS FOR END-STAGE RENAL DISEASE: ICD-10-CM

## 2025-06-17 DIAGNOSIS — N18.6 ENCOUNTER REGARDING VASCULAR ACCESS FOR DIALYSIS FOR END-STAGE RENAL DISEASE: ICD-10-CM

## 2025-06-17 PROCEDURE — 99215 OFFICE O/P EST HI 40 MIN: CPT | Mod: PBBFAC

## 2025-06-17 NOTE — PROGRESS NOTES
[facility] GENERAL SURGERY   Clinic Note     HPI:   Gatito Joaquin Sr. is a 60 y.o. with PMHx of ESRD on HD MWF, HLD, HTN, DM, PAD (bilateral BKA), and chronic back pain presents to the clinic for AV fistula creation. The pt has been receiving HD via tunneled catheter, requires permanent AV fistula creation for HD. The pt is R hand dominant and has R 3rd finger amputation due to osteomyelitis. The pt denies recent chest pain, sob, nausea, vomiting, diarrhea, constipation, fever, chills with no recent unintentional weight loss.     Review of Systems:  10 point review of systems denied unless otherwise indicated above HPI    Allergies:   Review of patient's allergies indicates:   Allergen Reactions    Tramadol Nausea And Vomiting       Meds:   Current Medications[1]    PMH:   Past Medical History:   Diagnosis Date    Chronic back pain     CKD (chronic kidney disease), stage V     Diabetes     HLD (hyperlipidemia)     associated with diabetes    HTN (hypertension)     associated with diabetes    Hx of BKA, left 2018    Hx of BKA, right 2018    PAD (peripheral artery disease)         Social History:   Social History[2]    Family History:   Family History   Problem Relation Name Age of Onset    Hypertension Mother      Diabetes Mother      Liver disease Mother      Diabetes Sister      Diabetes Brother      Diabetes Brother         Surgical History:   Past Surgical History:   Procedure Laterality Date    LEG AMPUTATION Bilateral 2018    below knee       Objective:  Vitals:  Vitals:    06/17/25 1532   BP: (!) 127/49   Pulse: 76   Resp: 20   Temp: 97.9 °F (36.6 °C)        Physical Exam:  Gen: NAD  Neuro: awake, alert, answering questions appropriately  CV: RRR, tunnel cath in place in R chest, dressing c/d/i  Resp: non-labored breathing  Abd: soft, ND, NT  Ext: moves all 4 spontaneously and purposefully, bilateral BKA with prosthetics, R 3rd finger covered w/ dressing c/d/I, US shows L cephalic vein with .62 cm diameter, radial and  brachial pulse 2+ in LUE  Skin: warm, well perfused    Pertinent Labs:  None     Imaging:  US Venous Mapping LUE  The left axillary, brachial and radial and ulnar arteries are patent.   The left ulnar artery has significant calcification and low velocities.    The left upper extremity vein measurements are as noted below.    There is a high bifurcation of the left brachial artery.     Left Upper Vein Mapping - Cephalic Measurements    Cephalic Diameter (cm)   Proximal upper arm 0.44 cm         Middle upper arm 0.42 cm         Distal upper arm 0.56 cm         Antecubital fossa 0.46 cm         Proximal forearm 0.16 cm         Middle forearm 0.14 cm         Distal forearm 0.14 cm               Left Upper Vein Mapping - Basilic Measurements    Basilic Diameter (cm)   Proximal upper arm 0.47 cm         Middle upper arm 0.44 cm         Distal upper arm 0.46 cm               Left Upper Vein Mapping - Left Arm Measurements    Left arm Diameter (cm)   Brachial vein 1 0.23 cm         Brachial vein 2 0.24 cm         Brachial artery 0.39 cm         Distal radial artery 0.25 cm              Micro/Path/Other:  None     Assessment/Plan:  Gatito Joaquin Sr. is a 60 y.o. male with PMHx of ESRD on HD MWF, HLD, HTN, DM, PAD (bilateral BKA), and chronic back pain presents to the clinic for AV fistula creation. The pt has been receiving HD via tunneled lie. Patient left arm venous mapping shown above.      - refer to cardiology for risk stratification   - preop labs  - RTC after risk stratification to schedule AVF    Hector Arevalo, MS 3  [facility]    I saw the patient with the medical student and agree with the documentation. I have altered the note as needed.    Gregory Burkett MD. PhD  [facility] Surgery, PGY1           [1]   Current Outpatient Medications:     amLODIPine (NORVASC) 10 MG tablet, Take 10 mg by mouth every evening., Disp: , Rfl:     aspirin 81 MG Chew, Take 81 mg by mouth once daily., Disp: ,  Rfl:     cloNIDine (CATAPRES) 0.1 MG tablet, Take 0.1 mg by mouth 3 (three) times daily., Disp: , Rfl:     ergocalciferol (ERGOCALCIFEROL) 50,000 unit Cap, Take 50,000 Units by mouth every 7 days., Disp: , Rfl:     hydrALAZINE (APRESOLINE) 25 MG tablet, Take 25 mg by mouth 3 (three) times daily., Disp: , Rfl:     isosorbide mononitrate (IMDUR) 30 MG 24 hr tablet, Take 90 mg by mouth every morning., Disp: , Rfl:     sodium citrate-citric acid 500-334 mg/5 ml (BICITRA) 500-334 mg/5 mL solution, Take by mouth., Disp: , Rfl:     torsemide (DEMADEX) 20 MG Tab, Take 40 mg by mouth every morning., Disp: , Rfl:     VELTASSA 8.4 gram PwPk, SMARTSI.4 Gram(s) By Mouth Every Morning, Disp: , Rfl:     clindamycin (CLEOCIN) 300 MG capsule, Take 300 mg by mouth 3 (three) times daily. (Patient not taking: Reported on 2025), Disp: , Rfl:     cyclobenzaprine (FLEXERIL) 10 MG tablet, Take 10 mg by mouth 2 (two) times daily. (Patient not taking: Reported on 2025), Disp: , Rfl:     gabapentin (NEURONTIN) 300 MG capsule, Take 300 mg by mouth 3 (three) times daily. (Patient not taking: Reported on 2025), Disp: , Rfl:     insulin detemir U-100, Levemir, (LEVEMIR FLEXTOUCH U100 INSULIN) 100 unit/mL (3 mL) InPn pen, Inject 52 Units into the skin. (Patient not taking: Reported on 2025), Disp: , Rfl:     tamsulosin (FLOMAX) 0.4 mg Cap, Take 1 capsule by mouth every evening. (Patient not taking: Reported on 2025), Disp: , Rfl:   [2]   Social History  Tobacco Use    Smoking status: Former     Current packs/day: 0.00     Types: Cigarettes     Quit date:      Years since quittin.4    Smokeless tobacco: Never

## 2025-06-17 NOTE — LETTER
June 17, 2025      [facility]  [address] [address]  Phone: [phone]       Patient: Gatito Joaquin   YOB: 1965  Date of Visit: 06/17/2025    To Whom It May Concern:    Jaya Mederos  was at [facility] on 06/17/2025. If you have any questions or concerns, or if I can be of further assistance, please do not hesitate to contact me.    Sincerely,    Kaitlin Jeronimo LPN

## 2025-08-05 ENCOUNTER — LAB REQUISITION (OUTPATIENT)
Dept: LAB | Facility: HOSPITAL | Age: 60
End: 2025-08-05
Payer: MEDICARE

## 2025-08-05 DIAGNOSIS — M86.9 OSTEOMYELITIS, UNSPECIFIED: ICD-10-CM

## 2025-08-05 LAB
ALBUMIN SERPL-MCNC: 2.6 G/DL (ref 3.4–4.8)
ALBUMIN/GLOB SERPL: 0.5 RATIO (ref 1.1–2)
ALP SERPL-CCNC: 303 UNIT/L (ref 40–150)
ALT SERPL-CCNC: 25 UNIT/L (ref 0–55)
ANION GAP SERPL CALC-SCNC: 10 MEQ/L
AST SERPL-CCNC: 43 UNIT/L (ref 11–45)
BASOPHILS # BLD AUTO: 0.02 X10(3)/MCL
BASOPHILS NFR BLD AUTO: 0.4 %
BILIRUB SERPL-MCNC: 0.6 MG/DL
BUN SERPL-MCNC: 34.8 MG/DL (ref 8.4–25.7)
CALCIUM SERPL-MCNC: 7.8 MG/DL (ref 8.8–10)
CHLORIDE SERPL-SCNC: 97 MMOL/L (ref 98–107)
CO2 SERPL-SCNC: 30 MMOL/L (ref 23–31)
CREAT SERPL-MCNC: 3.81 MG/DL (ref 0.72–1.25)
CREAT/UREA NIT SERPL: 9
CRP SERPL-MCNC: 19.1 MG/L
EOSINOPHIL # BLD AUTO: 0.02 X10(3)/MCL (ref 0–0.9)
EOSINOPHIL NFR BLD AUTO: 0.4 %
ERYTHROCYTE [DISTWIDTH] IN BLOOD BY AUTOMATED COUNT: 17.5 % (ref 11.5–17)
ERYTHROCYTE [SEDIMENTATION RATE] IN BLOOD: 52 MM/HR (ref 0–15)
GFR SERPLBLD CREATININE-BSD FMLA CKD-EPI: 17 ML/MIN/1.73/M2
GLOBULIN SER-MCNC: 5.5 GM/DL (ref 2.4–3.5)
GLUCOSE SERPL-MCNC: 83 MG/DL (ref 82–115)
HCT VFR BLD AUTO: 29 % (ref 42–52)
HGB BLD-MCNC: 9 G/DL (ref 14–18)
IMM GRANULOCYTES # BLD AUTO: 0.02 X10(3)/MCL (ref 0–0.04)
IMM GRANULOCYTES NFR BLD AUTO: 0.4 %
LYMPHOCYTES # BLD AUTO: 0.59 X10(3)/MCL (ref 0.6–4.6)
LYMPHOCYTES NFR BLD AUTO: 13.1 %
MCH RBC QN AUTO: 29.3 PG (ref 27–31)
MCHC RBC AUTO-ENTMCNC: 31 G/DL (ref 33–36)
MCV RBC AUTO: 94.5 FL (ref 80–94)
MONOCYTES # BLD AUTO: 0.36 X10(3)/MCL (ref 0.1–1.3)
MONOCYTES NFR BLD AUTO: 8 %
NEUTROPHILS # BLD AUTO: 3.51 X10(3)/MCL (ref 2.1–9.2)
NEUTROPHILS NFR BLD AUTO: 77.7 %
NRBC BLD AUTO-RTO: 0 %
PLATELET # BLD AUTO: 150 X10(3)/MCL (ref 130–400)
PMV BLD AUTO: 10.2 FL (ref 7.4–10.4)
POTASSIUM SERPL-SCNC: 3.9 MMOL/L (ref 3.5–5.1)
PROT SERPL-MCNC: 8.1 GM/DL (ref 5.8–7.6)
RBC # BLD AUTO: 3.07 X10(6)/MCL (ref 4.7–6.1)
SODIUM SERPL-SCNC: 137 MMOL/L (ref 136–145)
WBC # BLD AUTO: 4.52 X10(3)/MCL (ref 4.5–11.5)

## 2025-08-05 PROCEDURE — 86140 C-REACTIVE PROTEIN: CPT | Performed by: NURSE PRACTITIONER

## 2025-08-05 PROCEDURE — 85025 COMPLETE CBC W/AUTO DIFF WBC: CPT | Performed by: NURSE PRACTITIONER

## 2025-08-05 PROCEDURE — 80053 COMPREHEN METABOLIC PANEL: CPT | Performed by: NURSE PRACTITIONER

## 2025-08-05 PROCEDURE — 85652 RBC SED RATE AUTOMATED: CPT | Performed by: NURSE PRACTITIONER

## 2025-08-07 ENCOUNTER — HOSPITAL ENCOUNTER (OUTPATIENT)
Dept: CARDIOLOGY | Facility: HOSPITAL | Age: 60
Discharge: HOME | End: 2025-08-07
Payer: MEDICARE

## 2025-08-07 DIAGNOSIS — N18.6 ENCOUNTER REGARDING VASCULAR ACCESS FOR DIALYSIS FOR END-STAGE RENAL DISEASE: ICD-10-CM

## 2025-08-07 DIAGNOSIS — N18.6 ESRD (END STAGE RENAL DISEASE): ICD-10-CM

## 2025-08-07 DIAGNOSIS — Z99.2 ENCOUNTER REGARDING VASCULAR ACCESS FOR DIALYSIS FOR END-STAGE RENAL DISEASE: ICD-10-CM

## 2025-08-07 LAB
APICAL FOUR CHAMBER EJECTION FRACTION: 47 %
APICAL TWO CHAMBER EJECTION FRACTION: 57 %
AV INDEX (PROSTH): 0.48
AV MEAN GRADIENT: 15 MMHG
AV PEAK GRADIENT: 27 MMHG
AV VALVE AREA BY VELOCITY RATIO: 1.3 CM²
AV VALVE AREA: 1.5 CM²
AV VELOCITY RATIO: 0.42
CV ECHO LV RWT: 0.76 CM
DOP CALC AO PEAK VEL: 2.6 M/S
DOP CALC AO VTI: 47.7 CM
DOP CALC LVOT AREA: 3.1 CM2
DOP CALC LVOT DIAMETER: 2 CM
DOP CALC LVOT PEAK VEL: 1.1 M/S
DOP CALC MV VTI: 23.9 CM
DOP CALCLVOT PEAK VEL VTI: 23 CM
E WAVE DECELERATION TIME: 149 MSEC
E/A RATIO: 2.34
E/E' RATIO: 13 M/S
ECHO LV POSTERIOR WALL: 1.7 CM (ref 0.6–1.1)
EJECTION FRACTION: 49 %
FRACTIONAL SHORTENING: 26.7 % (ref 28–44)
HR MV ECHO: 80 BPM
INTERVENTRICULAR SEPTUM: 1.4 CM (ref 0.6–1.1)
IVC DIAMETER: 2.5 CM
LEFT ATRIUM AREA SYSTOLIC (APICAL 2 CHAMBER): 22.66 CM2
LEFT ATRIUM AREA SYSTOLIC (APICAL 4 CHAMBER): 20.65 CM2
LEFT ATRIUM SIZE: 4.6 CM
LEFT ATRIUM VOLUME MOD: 66 ML
LEFT INTERNAL DIMENSION IN SYSTOLE: 3.3 CM (ref 2.1–4)
LEFT VENTRICLE DIASTOLIC VOLUME: 94 ML
LEFT VENTRICLE END DIASTOLIC VOLUME APICAL 2 CHAMBER: 119.29 ML
LEFT VENTRICLE END DIASTOLIC VOLUME APICAL 4 CHAMBER: 78.45 ML
LEFT VENTRICLE END SYSTOLIC VOLUME APICAL 2 CHAMBER: 72.22 ML
LEFT VENTRICLE END SYSTOLIC VOLUME APICAL 4 CHAMBER: 55 ML
LEFT VENTRICLE SYSTOLIC VOLUME: 45 ML
LEFT VENTRICULAR INTERNAL DIMENSION IN DIASTOLE: 4.5 CM (ref 3.5–6)
LEFT VENTRICULAR MASS: 290 G
LV LATERAL E/E' RATIO: 12.9 M/S
LV SEPTAL E/E' RATIO: 12.9 M/S
LVED V (TEICH): 94.37 ML
LVES V (TEICH): 45.13 ML
LVOT MG: 2.58 MMHG
LVOT MV: 0.76 CM/S
MV MEAN GRADIENT: 2 MMHG
MV PEAK A VEL: 0.44 M/S
MV PEAK E VEL: 1.03 M/S
MV PEAK GRADIENT: 7 MMHG
MV VALVE AREA BY CONTINUITY EQUATION: 3.02 CM2
OHS LV EJECTION FRACTION SIMPSONS BIPLANE MOD: 53 %
PISA MRMAX VEL: 4 M/S
PISA TR MAX VEL: 3.9 M/S
RA MAJOR: 5.31 CM
RA PRESSURE ESTIMATED: 15 MMHG
RV TB RVSP: 19 MMHG
TDI LATERAL: 0.08 M/S
TDI SEPTAL: 0.08 M/S
TDI: 0.08 M/S
TR MAX PG: 60 MMHG
TRICUSPID ANNULAR PLANE SYSTOLIC EXCURSION: 1.2 CM
TV REST PULMONARY ARTERY PRESSURE: 76 MMHG

## 2025-08-07 PROCEDURE — 93306 TTE W/DOPPLER COMPLETE: CPT

## 2025-08-07 PROCEDURE — 93306 TTE W/DOPPLER COMPLETE: CPT | Mod: 26,,, | Performed by: INTERNAL MEDICINE
